# Patient Record
Sex: FEMALE | Race: WHITE | NOT HISPANIC OR LATINO | Employment: FULL TIME | URBAN - METROPOLITAN AREA
[De-identification: names, ages, dates, MRNs, and addresses within clinical notes are randomized per-mention and may not be internally consistent; named-entity substitution may affect disease eponyms.]

---

## 2017-08-02 ENCOUNTER — TRANSCRIBE ORDERS (OUTPATIENT)
Dept: LAB | Facility: HOSPITAL | Age: 32
End: 2017-08-02

## 2017-08-02 ENCOUNTER — APPOINTMENT (OUTPATIENT)
Dept: LAB | Facility: HOSPITAL | Age: 32
End: 2017-08-02
Payer: COMMERCIAL

## 2017-08-02 DIAGNOSIS — Z00.8 ENCOUNTER FOR OTHER GENERAL EXAMINATION: ICD-10-CM

## 2017-08-02 DIAGNOSIS — Z00.8 ENCOUNTER FOR OTHER GENERAL EXAMINATION: Primary | ICD-10-CM

## 2017-08-02 LAB
CHOLEST SERPL-MCNC: 152 MG/DL (ref 50–200)
EST. AVERAGE GLUCOSE BLD GHB EST-MCNC: 108 MG/DL
HBA1C MFR BLD: 5.4 % (ref 4.2–6.3)
HDLC SERPL-MCNC: 79 MG/DL (ref 40–60)
LDLC SERPL CALC-MCNC: 54 MG/DL (ref 0–100)
TRIGL SERPL-MCNC: 96 MG/DL

## 2017-08-02 PROCEDURE — 80061 LIPID PANEL: CPT

## 2017-08-02 PROCEDURE — 36415 COLL VENOUS BLD VENIPUNCTURE: CPT

## 2017-08-02 PROCEDURE — 83036 HEMOGLOBIN GLYCOSYLATED A1C: CPT

## 2018-07-27 ENCOUNTER — APPOINTMENT (OUTPATIENT)
Dept: LAB | Facility: HOSPITAL | Age: 33
End: 2018-07-27
Payer: COMMERCIAL

## 2018-07-27 ENCOUNTER — TRANSCRIBE ORDERS (OUTPATIENT)
Dept: LAB | Facility: HOSPITAL | Age: 33
End: 2018-07-27

## 2018-07-27 DIAGNOSIS — Z00.8 HEALTH EXAMINATION IN POPULATION SURVEY: ICD-10-CM

## 2018-07-27 DIAGNOSIS — Z00.8 HEALTH EXAMINATION IN POPULATION SURVEY: Primary | ICD-10-CM

## 2018-07-27 LAB
CHOLEST SERPL-MCNC: 144 MG/DL (ref 50–200)
EST. AVERAGE GLUCOSE BLD GHB EST-MCNC: 105 MG/DL
HBA1C MFR BLD: 5.3 % (ref 4.2–6.3)
HDLC SERPL-MCNC: 75 MG/DL (ref 40–60)
LDLC SERPL CALC-MCNC: 56 MG/DL (ref 0–100)
NONHDLC SERPL-MCNC: 69 MG/DL
TRIGL SERPL-MCNC: 67 MG/DL

## 2018-07-27 PROCEDURE — 83036 HEMOGLOBIN GLYCOSYLATED A1C: CPT

## 2018-07-27 PROCEDURE — 80061 LIPID PANEL: CPT

## 2018-07-27 PROCEDURE — 36415 COLL VENOUS BLD VENIPUNCTURE: CPT

## 2018-10-11 DIAGNOSIS — L03.011 PARONYCHIA OF FINGER OF RIGHT HAND: ICD-10-CM

## 2018-10-11 DIAGNOSIS — L03.011 PARONYCHIA OF FINGER OF RIGHT HAND: Primary | ICD-10-CM

## 2018-10-11 RX ORDER — CEPHALEXIN 500 MG/1
500 CAPSULE ORAL EVERY 12 HOURS SCHEDULED
Qty: 14 CAPSULE | Refills: 0 | Status: SHIPPED | OUTPATIENT
Start: 2018-10-11 | End: 2018-10-18

## 2018-10-11 RX ORDER — CEPHALEXIN 500 MG/1
500 CAPSULE ORAL EVERY 6 HOURS SCHEDULED
Qty: 14 CAPSULE | Refills: 0 | Status: SHIPPED | OUTPATIENT
Start: 2018-10-11 | End: 2018-10-11 | Stop reason: SDUPTHER

## 2020-12-19 ENCOUNTER — IMMUNIZATIONS (OUTPATIENT)
Dept: FAMILY MEDICINE CLINIC | Facility: HOSPITAL | Age: 35
End: 2020-12-19
Payer: COMMERCIAL

## 2020-12-19 DIAGNOSIS — Z23 ENCOUNTER FOR IMMUNIZATION: ICD-10-CM

## 2020-12-19 PROCEDURE — 0001A SARS-COV-2 / COVID-19 MRNA VACCINE (PFIZER-BIONTECH) 30 MCG: CPT

## 2020-12-19 PROCEDURE — 91300 SARS-COV-2 / COVID-19 MRNA VACCINE (PFIZER-BIONTECH) 30 MCG: CPT

## 2021-01-08 ENCOUNTER — IMMUNIZATIONS (OUTPATIENT)
Dept: FAMILY MEDICINE CLINIC | Facility: HOSPITAL | Age: 36
End: 2021-01-08

## 2021-01-08 DIAGNOSIS — Z23 ENCOUNTER FOR IMMUNIZATION: ICD-10-CM

## 2021-01-08 PROCEDURE — 0002A SARS-COV-2 / COVID-19 MRNA VACCINE (PFIZER-BIONTECH) 30 MCG: CPT

## 2021-01-08 PROCEDURE — 91300 SARS-COV-2 / COVID-19 MRNA VACCINE (PFIZER-BIONTECH) 30 MCG: CPT

## 2021-01-15 DIAGNOSIS — Z20.822 COVID-19 RULED OUT: Primary | ICD-10-CM

## 2021-02-12 ENCOUNTER — COSMETIC (OUTPATIENT)
Dept: DERMATOLOGY | Facility: CLINIC | Age: 36
End: 2021-02-12
Payer: COMMERCIAL

## 2021-02-12 DIAGNOSIS — Z41.1 ENCOUNTER FOR COSMETIC PROCEDURE: Primary | ICD-10-CM

## 2021-02-12 PROCEDURE — BOTOX1U PR BOTOX BY THE UNIT: Performed by: STUDENT IN AN ORGANIZED HEALTH CARE EDUCATION/TRAINING PROGRAM

## 2021-02-12 NOTE — PROGRESS NOTES
Botox Procedure Note    Screening Questions   Are you pregnant or breastfeeding? No  Do you take aspirin, fish oil or any other blood thinning medicines? No  Have you had an allergic reaction to any injectables before? No  Have you had any surgeries on your face? No  Have you been diagnosed with a muscle or nerve condition like myasthenia gravis, ALS or Lambert-Eaton syndrome? No      Diagnosis: rhytides    Location: glabella, forehead, periocular, nasalis    Informed consent: Discussed risks (infection, pain, bleeding, bruising, swelling, allergic reaction, paralysis of nearby muscles, eyelid droop, double vision, neck weakness, difficulty breathing, headache, undesirable cosmetic result, need for additional treatment, and death) and benefits of the procedure, as well as the alternatives  Informed consent was obtained  Photographs taken in chart after verbal consent     Preparation: The area was cleansed with alcohol  Procedure Details: Botox was injected into the dermis with a 30-gauge needle  Pressure applied to any bleeding  Lot Number: [id number]  Expiration: 08/23  Total Units Injected: 5U forehead, 18U periocular,    Plan: Patient instructed to remain upright for 6 hours  Tylenol may be used for headache  Allow 2 weeks before returning to clinic for additional dosing as needed  Call for any problems  Written instructions provided                                 Scribe Attestation    I,:  Bette García MA am acting as a scribe while in the presence of the attending physician :       I,:  Concetta Mantilla MD personally performed the services described in this documentation    as scribed in my presence :

## 2021-02-12 NOTE — PATIENT INSTRUCTIONS
Botulinum Toxin (Botox): PRE & POST-TREATMENT PATIENT INSTRUCTIONS    Pre-Treatment Instructions    Stop 3 DAYS BEFORE treatment to prevent bruising: AVOID blood thinning over-the-counter medications and herbal supplements such as Aspirin, Motrin, Ibuprofen, Aleve, Garlic, Vitamin E, Ginkgo Biloba, St  Esvins Wort, Fish Oil, Omega-3 capsules  Please note: If you are taking aspirin, warfarin, Plavix, or any other blood thinner under the guidance of a physician for heart disease, you should continue taking the medication as prescribed   Do not drink alcoholic beverages 24 hours before (or after) your treatment to avoid extra bruising   Do not use botulinum toxin if you are pregnant or breastfeeding, are allergic to any of its ingredients, or suffer from any neurological disorders  Please inform your provider if you have any questions about this prior to the treatment  Day of Treatment    Arrive to the office with a Mac Citizen  Please do not wear makeup  You may bring your own makeup to apply after your treatment   You may experience a mild amount of tenderness or a stinging sensation following injection  The injection sites will resemble bee stings but this should settle down within 20 minutes   You may experience some tenderness at the treatment site(s) that can last for a few hours or a few days  You may have bruises in the areas treated  Immediately After Treatment    It is best to try to exercise your treated muscles for 1-2 hours after treatment (e g  practice frowning, raising your eyebrows, and squinting)  This helps to work BOTOX® into your muscles   Stay in a vertical position for 6 hours following injection  DO NOT rest your head or lie down; sit upright   You may apply an ice or cold gel pack to the area(s) treated (avoiding pressure) as this helps reduce swelling and the potential for bruising      AVOID placing excessive pressure on the treated area(s) for the first few days; when cleansing your face or applying makeup, be very gentle   Do not work out for 24 hours   Any bruising can be covered up with make-up  You can also get topical Arnica (over the counter) to help th bruising resolve faster     Botox takes 7-10 days to kick in and lasts 3-4 months  If you want more Botox injected to the areas treated today, wait 2 weeks before coming back to the office

## 2021-02-22 ENCOUNTER — OFFICE VISIT (OUTPATIENT)
Dept: FAMILY MEDICINE CLINIC | Facility: CLINIC | Age: 36
End: 2021-02-22
Payer: COMMERCIAL

## 2021-02-22 VITALS
HEIGHT: 66 IN | SYSTOLIC BLOOD PRESSURE: 120 MMHG | DIASTOLIC BLOOD PRESSURE: 80 MMHG | TEMPERATURE: 97.4 F | BODY MASS INDEX: 20.51 KG/M2 | WEIGHT: 127.6 LBS

## 2021-02-22 DIAGNOSIS — M25.552 LEFT HIP PAIN: Primary | ICD-10-CM

## 2021-02-22 PROCEDURE — 99213 OFFICE O/P EST LOW 20 MIN: CPT | Performed by: FAMILY MEDICINE

## 2021-02-22 RX ORDER — MELOXICAM 15 MG/1
15 TABLET ORAL DAILY
Qty: 30 TABLET | Refills: 1 | Status: SHIPPED | OUTPATIENT
Start: 2021-02-22

## 2021-02-22 NOTE — PROGRESS NOTES
Assessment/Plan:  Patient go for x-ray left hip  Patient will start meloxicam daily  Patient will start physical therapy  Patient follow-up in 6 weeks if not seeing improvement  To consider MRI if not seeing improvement       Diagnoses and all orders for this visit:    Left hip pain  -     XR hip/pelv 2-3 vws left if performed; Future  -     Ambulatory referral to Physical Therapy; Future  -     meloxicam (MOBIC) 15 mg tablet; Take 1 tablet (15 mg total) by mouth daily            Subjective:        Patient ID: Sergio Hurd is a 28 y o  female  Patient is here with left hip pain anteriorly over the past 6-8 weeks  No direct trauma prior to this  Patient did have flare hip pain status post   Skiing  Patient did have a massage which made it worse  The patient without any instability  Patient does notice pain with crossing legs period patient did have some weakness which has improved  patient did use NSAIDs for roughly 10 days ibuprofen 600 mg twice daily  Patient is using intermittently  Patient with some low back pain intermittently  No change urination or defecation  No gynecologic issues presently  No redness or bruising noted  No rash noted  No fevers noted  The no significant change with change in footwear  The following portions of the patient's history were reviewed and updated as appropriate: allergies, current medications, past family history, past medical history, past social history, past surgical history and problem list       Review of Systems   Constitutional: Negative  HENT: Negative  Eyes: Negative  Respiratory: Negative  Cardiovascular: Negative  Gastrointestinal: Negative  Endocrine: Negative  Genitourinary: Negative  Musculoskeletal: Positive for arthralgias  Skin: Negative  Allergic/Immunologic: Negative  Neurological: Negative  Hematological: Negative  Psychiatric/Behavioral: Negative              Objective:               /80 (BP Location: Right arm, Patient Position: Sitting, Cuff Size: Standard)   Temp (!) 97 4 °F (36 3 °C) (Tympanic)   Ht 5' 5 5" (1 664 m)   Wt 57 9 kg (127 lb 9 6 oz)   BMI 20 91 kg/m²          Physical Exam  Vitals signs and nursing note reviewed  Constitutional:       General: She is not in acute distress  Appearance: Normal appearance  She is not ill-appearing, toxic-appearing or diaphoretic  HENT:      Head: Normocephalic and atraumatic  Right Ear: Tympanic membrane, ear canal and external ear normal  There is no impacted cerumen  Left Ear: Tympanic membrane, ear canal and external ear normal  There is no impacted cerumen  Nose: Nose normal  No congestion or rhinorrhea  Mouth/Throat:      Mouth: Mucous membranes are moist       Pharynx: No oropharyngeal exudate or posterior oropharyngeal erythema  Eyes:      General: No scleral icterus  Right eye: No discharge  Left eye: No discharge  Extraocular Movements: Extraocular movements intact  Conjunctiva/sclera: Conjunctivae normal       Pupils: Pupils are equal, round, and reactive to light  Neck:      Musculoskeletal: Normal range of motion and neck supple  No neck rigidity or muscular tenderness  Vascular: No carotid bruit  Cardiovascular:      Rate and Rhythm: Normal rate and regular rhythm  Pulses: Normal pulses  Heart sounds: Normal heart sounds  No murmur  No friction rub  No gallop  Pulmonary:      Effort: Pulmonary effort is normal  No respiratory distress  Breath sounds: Normal breath sounds  No stridor  No wheezing, rhonchi or rales  Chest:      Chest wall: No tenderness  Abdominal:      General: Abdomen is flat  Bowel sounds are normal  There is no distension  Palpations: Abdomen is soft  Tenderness: There is no abdominal tenderness  There is no guarding or rebound  Musculoskeletal:         General: Tenderness present  No swelling, deformity or signs of injury  Right lower leg: No edema  Left lower leg: No edema  Comments: Some tenderness in the left inguinal region with hip flexion as well as external rotation  No back pain with palpation  Lymphadenopathy:      Cervical: No cervical adenopathy  Skin:     General: Skin is warm and dry  Capillary Refill: Capillary refill takes less than 2 seconds  Coloration: Skin is not jaundiced  Findings: No bruising, erythema, lesion or rash  Neurological:      General: No focal deficit present  Mental Status: She is alert and oriented to person, place, and time  Cranial Nerves: No cranial nerve deficit  Sensory: No sensory deficit  Motor: No weakness  Coordination: Coordination normal       Gait: Gait normal    Psychiatric:         Mood and Affect: Mood normal          Behavior: Behavior normal          Thought Content:  Thought content normal          Judgment: Judgment normal

## 2021-02-23 ENCOUNTER — TELEPHONE (OUTPATIENT)
Dept: FAMILY MEDICINE CLINIC | Facility: CLINIC | Age: 36
End: 2021-02-23

## 2021-02-23 ENCOUNTER — TRANSCRIBE ORDERS (OUTPATIENT)
Dept: RADIOLOGY | Facility: HOSPITAL | Age: 36
End: 2021-02-23

## 2021-02-23 ENCOUNTER — HOSPITAL ENCOUNTER (OUTPATIENT)
Dept: RADIOLOGY | Facility: HOSPITAL | Age: 36
Discharge: HOME | End: 2021-02-23
Payer: COMMERCIAL

## 2021-02-23 DIAGNOSIS — M25.552 LEFT HIP PAIN: ICD-10-CM

## 2021-02-23 PROCEDURE — 73502 X-RAY EXAM HIP UNI 2-3 VIEWS: CPT

## 2021-03-03 ENCOUNTER — EVALUATION (OUTPATIENT)
Dept: PHYSICAL THERAPY | Facility: MEDICAL CENTER | Age: 36
End: 2021-03-03
Payer: COMMERCIAL

## 2021-03-03 DIAGNOSIS — M25.552 LEFT HIP PAIN: Primary | ICD-10-CM

## 2021-03-03 PROCEDURE — 97140 MANUAL THERAPY 1/> REGIONS: CPT | Performed by: PHYSICAL THERAPIST

## 2021-03-03 PROCEDURE — 97161 PT EVAL LOW COMPLEX 20 MIN: CPT | Performed by: PHYSICAL THERAPIST

## 2021-03-03 NOTE — PROGRESS NOTES
PT Evaluation     Today's date: 3/3/2021  Patient name: Sid King  : 1985  MRN: [id number]  Referring provider: Agustín Leal DO  Dx:   Encounter Diagnosis   Name Primary?  Left hip pain Yes                  Assessment  Assessment details: Sid King is a 29 y/o female who presents with complaints of left hip pain  The patients greatest concerns are not being able to continue her workout routine without hip pain  Primary movement impairment diagnosis of left iliopsoas tissue extensibility deficits, resulting in pathoanatomical symptoms of left hip pain, which limits her ability to perform functional activities without pain  Pt  will benefit from skilled PT services that includes manual therapy techniques to enhance tissue extensibility, neuromuscular re-education to facilitate motor control, therapeutic exercise to increase functional mobility, and modalities prn to reduce pain and inflammation    Impairments: abnormal muscle firing, abnormal or restricted ROM, activity intolerance, impaired physical strength, lacks appropriate home exercise program and pain with function  Understanding of Dx/Px/POC: good   Prognosis: good    Goals  Impairment Goals  - Pt I with initial HEP in 1-2 visits  - Improve lumbar ROM to [facility] in 4-6 weeks  - Increase strength to 5/5 in all affected areas in 4-6 weeks    Functional Goals  - Increase Functional Status Measure to: 85+ in 6-8 weeks  - Patient will be independent with comprehensive HEP in 6-8 weeks  - Ambulation is improved to prior level of function in 6-8 weeks  - Squatting is improved to prior level of function in 6-8 weeks  - Patient will be able to perform workout routine without pain afterwards in 6-8 weeks    Plan  Patient would benefit from: PT eval  Planned modality interventions: thermotherapy: hydrocollator packs  Planned therapy interventions: joint mobilization, manual therapy, massage, patient education, stretching, strengthening, therapeutic exercise, home exercise program, graded exercise, flexibility, neuromuscular re-education, body mechanics training and abdominal trunk stabilization  Frequency: 2x week  Duration in weeks: 8  Treatment plan discussed with: patient      Subjective Evaluation    History of Present Illness  Mechanism of injury: Patient reports that a couple months ago, she started to experience left achey hip pain after working out  She was lifting free weights and doing the elliptical often  Patient then took some time off with moderate relief and when she went skiing in January, the pain returned  Patient tried massage, which increased her pain  She notes relief c/ taking NSAIDs  She is currently taking meloxicam   Patient denies low back pain, but notes stiffness  Patient would like to return to performing her workout routine without difficulty  Pain  Current pain ratin  At best pain ratin  At worst pain ratin  Quality: dull ache and sharp  Alleviating factors: NSAIDs  Diagnostic Tests  X-ray: normal  Treatments  Previous treatment: massage  Current treatment: physical therapy  Patient Goals  Patient goals for therapy: decreased pain, increased strength, independence with ADLs/IADLs and return to sport/leisure activities        Objective     Palpation   Left   Hypertonic in the iliopsoas and TFL  Tenderness of the iliopsoas and TFL  Tenderness     Left Hip   No tenderness in the ASIS, PSIS and greater trochanter       Lumbar Screen  Lumbar range of motion within normal limits with the following exceptions:Limited lumbar extension    Neurological Testing     Sensation     Hip   Left Hip   Intact: light touch    Right Hip   Intact: light touch    Active Range of Motion     Lumbar   Flexion:  WFL  Extension:  Restriction level: minimal  Left lateral flexion:  WFL  Right lateral flexion:  WFL  Left rotation:  WFL  Right rotation:  [facility]    Passive Range of Motion   Left Hip   Normal passive range of motion    Right Hip   Normal passive range of motion    Joint Play   Left Hip   Hypomobile in the posterior hip capsule  Strength/Myotome Testing     Left Hip   Planes of Motion   Flexion: 3+  Extension: 4-  Abduction: 3+  Adduction: 4-    Isolated Muscles   Gluteus devi: 4-  Gluteus medius: 3+  Iliopsoas: 4-    Right Hip   Planes of Motion   Flexion: 5  Extension: 4  Abduction: 4  Adduction: 4    Isolated Muscles   Gluteus maximums: 4  Gluteus medius: 4  Iliopsoas: 5    Tests     Left Hip   Positive long sit and Kelly  Negative RUSS, FADIR, scour, SI compression and SI distraction  Modified Anthony: Positive  Additional Tests Details  (+) functional leg length discrepancy c/ L LE shorter    Functional Assessment      Squat    Left within functional limits and right within functional limits  Single Leg Squat   Left Leg  Valgus and anterior tibial translation beyond toes  Right Leg  Within functional limits  Single Leg Stance   Left single leg stance time: 10s  Right single leg stance time: 10s         Precautions: None    Manuals 3/3            Iliopsoas release AZ            STM/MFR AZ            UPAs L4-5 R AZ            Post hip mob AZ            Neuro Re-Ed             TA isometrics                                                    Ther Ex             Bridges 20x5s            SL bridges 2x10            Clams 3x10 5s hold            1/2 kneeling hip flex str 10x 10s                                      Foam Roll                           Ther Activity                                       Gait Training                                       Modalities             MH                               Flowsheet Rows      Most Recent Value   PT/OT G-Codes   Current Score  72   Projected Score  80          Benigno Olivas  3/3/2021,6:28 PM

## 2021-03-08 ENCOUNTER — OFFICE VISIT (OUTPATIENT)
Dept: PHYSICAL THERAPY | Facility: MEDICAL CENTER | Age: 36
End: 2021-03-08
Payer: COMMERCIAL

## 2021-03-08 DIAGNOSIS — M25.552 LEFT HIP PAIN: Primary | ICD-10-CM

## 2021-03-08 PROCEDURE — 97140 MANUAL THERAPY 1/> REGIONS: CPT | Performed by: PHYSICAL THERAPIST

## 2021-03-08 PROCEDURE — 97110 THERAPEUTIC EXERCISES: CPT | Performed by: PHYSICAL THERAPIST

## 2021-03-08 NOTE — PROGRESS NOTES
Daily Note     Today's date: 3/8/2021  Patient name: Estuardo Mg  : 1985  MRN: [id number]  Referring provider: Gerardo Escobedo DO  Dx:   Encounter Diagnosis     ICD-10-CM    1  Left hip pain  M25 552                   Subjective:  Patient states that she feels good  Objective: See treatment diary below  Assessment:  Patient presents without pain in the left hip  She presents c/ mild lumbar hypomobility bilaterally today  She did well c/ exercise progressions  Tolerated treatment well  Patient would benefit from continued PT  Plan: Continue per plan of care        Precautions: None    Manuals 3/3 3/8           Iliopsoas release AZ AZ           STM/MFR AZ AZ           UPAs L4-5 R AZ AZ           Post hip mob AZ AZ           Neuro Re-Ed             TA isometrics  20x5s           Marches  20x                                     Ther Ex             Bridges 20x5s 20x5s           SL bridges 2x10 3x10           Clams 3x10 5s hold 3x10 5s hold GR           Prone glue squeezes  30x5s           Prone glute sets KF  20x5s           1/2 kneeling hip flex str 10x 10s 10x 10s   2 way                                     Foam Roll   2'                        Ther Activity                                       Gait Training                                       Modalities             MH

## 2021-03-12 ENCOUNTER — OFFICE VISIT (OUTPATIENT)
Dept: PHYSICAL THERAPY | Facility: MEDICAL CENTER | Age: 36
End: 2021-03-12
Payer: COMMERCIAL

## 2021-03-12 DIAGNOSIS — M25.552 LEFT HIP PAIN: Primary | ICD-10-CM

## 2021-03-12 PROCEDURE — 97140 MANUAL THERAPY 1/> REGIONS: CPT | Performed by: PHYSICAL THERAPIST

## 2021-03-12 PROCEDURE — 97110 THERAPEUTIC EXERCISES: CPT | Performed by: PHYSICAL THERAPIST

## 2021-03-12 NOTE — PROGRESS NOTES
Daily Note     Today's date: 3/12/2021  Patient name: Quintin Garcia  : 1985  MRN: [id number]  Referring provider: Yari Batista DO  Dx:   Encounter Diagnosis     ICD-10-CM    1  Left hip pain  M25 552                   Subjective:  Patient states that her left hip feels better  Objective: See treatment diary below    Assessment:  Patient presents c/ left SI joint pain today and pelvic malalignment  She has pain c/ bending forward and extending  MET reduced her pain  Did not perform single leg bridges today  Tolerated treatment well  Patient would continue to benefit from PT  Plan: Continue per plan of care        Precautions: None    Manuals 3/3 3/8 3/12          Iliopsoas release AZ AZ AZ L          STM/MFR AZ AZ           UPAs L4-5 R AZ AZ AZ b/l          Post hip mob AZ AZ AZ          Neuro Re-Ed             TA isometrics  20x5s 20x5s          Marches  20x 20x                                    Ther Ex             MET   R heel 10x5s          Hip add   20x5s          Bridges 20x5s 20x5s 20x c/ ball          SL bridges 2x10 3x10 3x10          Clams 3x10 5s hold 3x10 5s hold GR 3x10 5s hold GR          Prone glue squeezes  30x5s 30x5s          Prone glute sets KF  20x5s [id number] lift          1/2 kneeling hip flex str 10x 10s 10x 10s   2 way 10x 10s 2 way                                    Foam Roll   2' 2'                       Ther Activity                                       Gait Training                                       Modalities

## 2021-03-17 ENCOUNTER — OFFICE VISIT (OUTPATIENT)
Dept: PHYSICAL THERAPY | Facility: MEDICAL CENTER | Age: 36
End: 2021-03-17
Payer: COMMERCIAL

## 2021-03-17 DIAGNOSIS — M25.552 LEFT HIP PAIN: Primary | ICD-10-CM

## 2021-03-17 PROCEDURE — 97112 NEUROMUSCULAR REEDUCATION: CPT | Performed by: PHYSICAL THERAPIST

## 2021-03-17 PROCEDURE — 97110 THERAPEUTIC EXERCISES: CPT | Performed by: PHYSICAL THERAPIST

## 2021-03-17 PROCEDURE — 97140 MANUAL THERAPY 1/> REGIONS: CPT | Performed by: PHYSICAL THERAPIST

## 2021-03-17 NOTE — PROGRESS NOTES
Daily Note     Today's date: 3/17/2021  Patient name: Mark Bourgeois  : 1985  MRN: [id number]  Referring provider: Nyla Bullock DO  Dx:   Encounter Diagnosis     ICD-10-CM    1  Left hip pain  M25 552                   Subjective:  Patient states that she feels good  Objective: See treatment diary below  Assessment:  Patient presents c/ left piriformis and iliopsoas tightness  She demonstrates increased posterior chain neuromuscular control  She was able to workout this week without pain  Tolerated treatment well  Patient would benefit from continued PT  Plan: Continue per plan of care        Precautions: None    Manuals 3/3 3/8 3/12 3/17         Iliopsoas release AZ AZ AZ L AZ         STM/MFR AZ AZ           UPAs L4-5 R AZ AZ AZ b/l AZ         Post hip mob AZ AZ AZ          Neuro Re-Ed             TA isometrics  20x5s 20x5s 20x5s         Marches  20x 20x 20x         Marches c/ band    20x blue                      Ther Ex             MET   R heel 10x5s R heel 10x5s         Hip add   20x5s 20x5s         Bridges 20x5s 20x5s 20x c/ ball 20x c/ ball         SL bridges 2x10 3x10 3x10 np         Clams 3x10 5s hold 3x10 5s hold GR 3x10 5s hold GR 3x10 5s hold GR         Prone glue squeezes  30x5s 30x5s 30x5s         Prone glute sets KF  20x5s [id number] lift 20x5s         1/2 kneeling hip flex str 10x 10s 10x 10s   2 way 10x 10s 2 way 10x 10s 2 way                                   Foam Roll   2' 2' 2'                      Ther Activity                                       Gait Training                                       Modalities

## 2021-03-24 ENCOUNTER — OFFICE VISIT (OUTPATIENT)
Dept: PHYSICAL THERAPY | Facility: MEDICAL CENTER | Age: 36
End: 2021-03-24
Payer: COMMERCIAL

## 2021-03-24 DIAGNOSIS — M25.552 LEFT HIP PAIN: Primary | ICD-10-CM

## 2021-03-24 PROCEDURE — G0145 SCR C/V CYTO,THINLAYER,RESCR: HCPCS | Performed by: OBSTETRICS & GYNECOLOGY

## 2021-03-24 PROCEDURE — 97140 MANUAL THERAPY 1/> REGIONS: CPT | Performed by: PHYSICAL THERAPIST

## 2021-03-24 PROCEDURE — 97110 THERAPEUTIC EXERCISES: CPT | Performed by: PHYSICAL THERAPIST

## 2021-03-24 PROCEDURE — 87624 HPV HI-RISK TYP POOLED RSLT: CPT | Performed by: OBSTETRICS & GYNECOLOGY

## 2021-03-24 PROCEDURE — 97112 NEUROMUSCULAR REEDUCATION: CPT | Performed by: PHYSICAL THERAPIST

## 2021-03-24 NOTE — PROGRESS NOTES
Daily Note     Today's date: 3/24/2021  Patient name: Alexys Castillo  : 1985  MRN: [id number]  Referring provider: Parveen Farfan DO  Dx:   Encounter Diagnosis     ICD-10-CM    1  Left hip pain  M25 552                   Subjective:  Patient states that she is doing well  Objective: See treatment diary below  Assessment:  Patient presents without pelvic malalignment  She is doing well c/ exercise progressions  Tolerated treatment well  Patient would benefit from continued PT  Plan: Continue per POC and d/c to HEP as able       Precautions: None    Manuals 3/3 3/8 3/12 3/17 3/24        Iliopsoas release AZ AZ AZ L AZ AZ        STM/MFR AZ AZ           UPAs L4-5 R AZ AZ AZ b/l AZ AZ        Post hip mob AZ AZ AZ          Neuro Re-Ed             TA isometrics  20x5s 20x5s 20x5s 20x5s        Marches  20x 20x 20x 20x        Marches c/ band    20x blue 20x blue                     Ther Ex             MET   R heel 10x5s R heel 10x5s np        Hip add   20x5s 20x5s 20x5s        Bridges 20x5s 20x5s 20x c/ ball 20x c/ ball 20x c/ ball        SL bridges 2x10 3x10 3x10 np np        Clams 3x10 5s hold 3x10 5s hold GR 3x10 5s hold GR 3x10 5s hold GR [id number]        Prone glue squeezes  30x5s 30x5s 30x5s 30x5s        Prone glute sets KF  20x5s [id number] lift 20x5s 20x5s        1/2 kneeling hip flex str 10x 10s 10x 10s   2 way 10x 10s 2 way 10x 10s 2 way 10x 10s 2 way                                  Foam Roll   2' 2' 2' 2'                     Ther Activity                                       Gait Training                                       Modalities             MH

## 2021-03-25 ENCOUNTER — LAB REQUISITION (OUTPATIENT)
Dept: LAB | Facility: HOSPITAL | Age: 36
End: 2021-03-25
Payer: COMMERCIAL

## 2021-03-25 DIAGNOSIS — Z12.4 ENCOUNTER FOR SCREENING FOR MALIGNANT NEOPLASM OF CERVIX: ICD-10-CM

## 2021-03-26 LAB
HPV HR 12 DNA CVX QL NAA+PROBE: NEGATIVE
HPV16 DNA CVX QL NAA+PROBE: NEGATIVE
HPV18 DNA CVX QL NAA+PROBE: NEGATIVE

## 2021-03-30 LAB
LAB AP GYN PRIMARY INTERPRETATION: NORMAL
Lab: NORMAL

## 2021-04-14 ENCOUNTER — TRANSCRIBE ORDERS (OUTPATIENT)
Dept: LAB | Facility: HOSPITAL | Age: 36
End: 2021-04-14

## 2021-04-14 ENCOUNTER — APPOINTMENT (OUTPATIENT)
Dept: LAB | Facility: HOSPITAL | Age: 36
End: 2021-04-14
Payer: COMMERCIAL

## 2021-04-14 DIAGNOSIS — Z00.8 HEALTH EXAMINATION IN POPULATION SURVEY: Primary | ICD-10-CM

## 2021-04-14 DIAGNOSIS — Z00.8 HEALTH EXAMINATION IN POPULATION SURVEY: ICD-10-CM

## 2021-04-14 LAB
CHOLEST SERPL-MCNC: 158 MG/DL (ref 50–200)
EST. AVERAGE GLUCOSE BLD GHB EST-MCNC: 105 MG/DL
HBA1C MFR BLD: 5.3 %
HDLC SERPL-MCNC: 75 MG/DL
LDLC SERPL CALC-MCNC: 68 MG/DL (ref 0–100)
NONHDLC SERPL-MCNC: 83 MG/DL
TRIGL SERPL-MCNC: 73 MG/DL

## 2021-04-14 PROCEDURE — 36415 COLL VENOUS BLD VENIPUNCTURE: CPT

## 2021-04-14 PROCEDURE — 83036 HEMOGLOBIN GLYCOSYLATED A1C: CPT

## 2021-04-14 PROCEDURE — 80061 LIPID PANEL: CPT

## 2021-10-16 ENCOUNTER — IMMUNIZATIONS (OUTPATIENT)
Dept: FAMILY MEDICINE CLINIC | Facility: HOSPITAL | Age: 36
End: 2021-10-16

## 2021-10-16 DIAGNOSIS — Z23 ENCOUNTER FOR IMMUNIZATION: Primary | ICD-10-CM

## 2021-10-16 PROCEDURE — 91300 SARS-COV-2 / COVID-19 MRNA VACCINE (PFIZER-BIONTECH) 30 MCG: CPT

## 2021-10-16 PROCEDURE — 0001A SARS-COV-2 / COVID-19 MRNA VACCINE (PFIZER-BIONTECH) 30 MCG: CPT

## 2021-12-23 ENCOUNTER — LAB (OUTPATIENT)
Dept: LAB | Facility: HOSPITAL | Age: 36
End: 2021-12-23
Payer: COMMERCIAL

## 2021-12-23 ENCOUNTER — OFFICE VISIT (OUTPATIENT)
Dept: FAMILY MEDICINE CLINIC | Facility: CLINIC | Age: 36
End: 2021-12-23
Payer: COMMERCIAL

## 2021-12-23 VITALS
OXYGEN SATURATION: 98 % | SYSTOLIC BLOOD PRESSURE: 102 MMHG | HEART RATE: 70 BPM | DIASTOLIC BLOOD PRESSURE: 76 MMHG | WEIGHT: 127 LBS | RESPIRATION RATE: 14 BRPM | BODY MASS INDEX: 20.41 KG/M2 | TEMPERATURE: 97.2 F | HEIGHT: 66 IN

## 2021-12-23 DIAGNOSIS — L30.9 DERMATITIS: ICD-10-CM

## 2021-12-23 DIAGNOSIS — L30.9 DERMATITIS: Primary | ICD-10-CM

## 2021-12-23 LAB
ALBUMIN SERPL BCP-MCNC: 4.2 G/DL (ref 3.5–5)
ALP SERPL-CCNC: 73 U/L (ref 46–116)
ALT SERPL W P-5'-P-CCNC: 24 U/L (ref 12–78)
ANION GAP SERPL CALCULATED.3IONS-SCNC: 6 MMOL/L (ref 4–13)
AST SERPL W P-5'-P-CCNC: 19 U/L (ref 5–45)
BASOPHILS # BLD AUTO: 0.02 THOUSANDS/ΜL (ref 0–0.1)
BASOPHILS NFR BLD AUTO: 0 % (ref 0–1)
BILIRUB SERPL-MCNC: 0.43 MG/DL (ref 0.2–1)
BUN SERPL-MCNC: 13 MG/DL (ref 5–25)
CALCIUM SERPL-MCNC: 9.1 MG/DL (ref 8.3–10.1)
CHLORIDE SERPL-SCNC: 105 MMOL/L (ref 100–108)
CO2 SERPL-SCNC: 26 MMOL/L (ref 21–32)
CREAT SERPL-MCNC: 0.69 MG/DL (ref 0.6–1.3)
EOSINOPHIL # BLD AUTO: 0.09 THOUSAND/ΜL (ref 0–0.61)
EOSINOPHIL NFR BLD AUTO: 2 % (ref 0–6)
ERYTHROCYTE [DISTWIDTH] IN BLOOD BY AUTOMATED COUNT: 13.3 % (ref 11.6–15.1)
GFR SERPL CREATININE-BSD FRML MDRD: 112 ML/MIN/1.73SQ M
GLUCOSE SERPL-MCNC: 79 MG/DL (ref 65–140)
HCT VFR BLD AUTO: 41.6 % (ref 34.8–46.1)
HGB BLD-MCNC: 13.6 G/DL (ref 11.5–15.4)
IMM GRANULOCYTES # BLD AUTO: 0.02 THOUSAND/UL (ref 0–0.2)
IMM GRANULOCYTES NFR BLD AUTO: 0 % (ref 0–2)
LYMPHOCYTES # BLD AUTO: 1.82 THOUSANDS/ΜL (ref 0.6–4.47)
LYMPHOCYTES NFR BLD AUTO: 34 % (ref 14–44)
MCH RBC QN AUTO: 31.5 PG (ref 26.8–34.3)
MCHC RBC AUTO-ENTMCNC: 32.7 G/DL (ref 31.4–37.4)
MCV RBC AUTO: 96 FL (ref 82–98)
MONOCYTES # BLD AUTO: 0.5 THOUSAND/ΜL (ref 0.17–1.22)
MONOCYTES NFR BLD AUTO: 9 % (ref 4–12)
NEUTROPHILS # BLD AUTO: 2.96 THOUSANDS/ΜL (ref 1.85–7.62)
NEUTS SEG NFR BLD AUTO: 55 % (ref 43–75)
NRBC BLD AUTO-RTO: 0 /100 WBCS
PLATELET # BLD AUTO: 286 THOUSANDS/UL (ref 149–390)
PMV BLD AUTO: 10.5 FL (ref 8.9–12.7)
POTASSIUM SERPL-SCNC: 4.1 MMOL/L (ref 3.5–5.3)
PROT SERPL-MCNC: 8.2 G/DL (ref 6.4–8.2)
RBC # BLD AUTO: 4.32 MILLION/UL (ref 3.81–5.12)
SODIUM SERPL-SCNC: 137 MMOL/L (ref 136–145)
TSH SERPL DL<=0.05 MIU/L-ACNC: 1.62 UIU/ML (ref 0.36–3.74)
WBC # BLD AUTO: 5.41 THOUSAND/UL (ref 4.31–10.16)

## 2021-12-23 PROCEDURE — 84443 ASSAY THYROID STIM HORMONE: CPT

## 2021-12-23 PROCEDURE — 85025 COMPLETE CBC W/AUTO DIFF WBC: CPT

## 2021-12-23 PROCEDURE — 99213 OFFICE O/P EST LOW 20 MIN: CPT | Performed by: FAMILY MEDICINE

## 2021-12-23 PROCEDURE — 36415 COLL VENOUS BLD VENIPUNCTURE: CPT

## 2021-12-23 PROCEDURE — 80053 COMPREHEN METABOLIC PANEL: CPT

## 2021-12-23 RX ORDER — CLOBETASOL PROPIONATE 0.5 MG/G
AEROSOL, FOAM TOPICAL 2 TIMES DAILY
Qty: 50 G | Refills: 1 | Status: SHIPPED | OUTPATIENT
Start: 2021-12-23

## 2022-05-05 ENCOUNTER — COSMETIC (OUTPATIENT)
Dept: PLASTIC SURGERY | Facility: CLINIC | Age: 37
End: 2022-05-05

## 2022-05-05 DIAGNOSIS — Z41.1 ENCOUNTER FOR COSMETIC PROCEDURE: ICD-10-CM

## 2022-05-05 PROCEDURE — RECHECK: Performed by: STUDENT IN AN ORGANIZED HEALTH CARE EDUCATION/TRAINING PROGRAM

## 2022-05-05 PROCEDURE — BOTOX1U PR BOTOX BY THE UNIT: Performed by: STUDENT IN AN ORGANIZED HEALTH CARE EDUCATION/TRAINING PROGRAM

## 2022-05-05 NOTE — PROGRESS NOTES
Botox Consult     First time?: no  Allergies: none  Blood thinners: no  Pregnant: no  Neuromuscular conditions: none    Patient has had botox, interested in maintenance  Will proceed with 40 units of botox     Risks and benefits discussed, patient agreed to proceed       10 units to each crows feet  16 units to glabellum  4 units to forehead     Total 40 units, 30% off, botox day     Patient tolerated well, f/u in 3 months        MD Teo Martini [facility]   [address] [facility] [address], [address]   Office: [phone]

## 2022-09-13 ENCOUNTER — TRANSCRIBE ORDERS (OUTPATIENT)
Dept: LAB | Facility: CLINIC | Age: 37
End: 2022-09-13

## 2022-09-13 ENCOUNTER — APPOINTMENT (OUTPATIENT)
Dept: LAB | Facility: CLINIC | Age: 37
End: 2022-09-13

## 2022-09-13 DIAGNOSIS — Z00.8 HEALTH EXAMINATION IN POPULATION SURVEY: Primary | ICD-10-CM

## 2022-09-13 DIAGNOSIS — Z00.8 HEALTH EXAMINATION IN POPULATION SURVEY: ICD-10-CM

## 2022-09-13 LAB
CHOLEST SERPL-MCNC: 183 MG/DL
EST. AVERAGE GLUCOSE BLD GHB EST-MCNC: 105 MG/DL
HBA1C MFR BLD: 5.3 %
HDLC SERPL-MCNC: 77 MG/DL
LDLC SERPL CALC-MCNC: 80 MG/DL (ref 0–100)
NONHDLC SERPL-MCNC: 106 MG/DL
TRIGL SERPL-MCNC: 128 MG/DL

## 2022-09-13 PROCEDURE — 83036 HEMOGLOBIN GLYCOSYLATED A1C: CPT

## 2022-09-13 PROCEDURE — 36415 COLL VENOUS BLD VENIPUNCTURE: CPT

## 2022-09-13 PROCEDURE — 80061 LIPID PANEL: CPT

## 2022-10-03 ENCOUNTER — APPOINTMENT (OUTPATIENT)
Dept: LAB | Facility: MEDICAL CENTER | Age: 37
End: 2022-10-03
Payer: COMMERCIAL

## 2022-10-03 ENCOUNTER — OFFICE VISIT (OUTPATIENT)
Dept: FAMILY MEDICINE CLINIC | Facility: CLINIC | Age: 37
End: 2022-10-03
Payer: COMMERCIAL

## 2022-10-03 VITALS
WEIGHT: 133.4 LBS | BODY MASS INDEX: 21.44 KG/M2 | DIASTOLIC BLOOD PRESSURE: 68 MMHG | TEMPERATURE: 98.3 F | SYSTOLIC BLOOD PRESSURE: 110 MMHG | HEIGHT: 66 IN

## 2022-10-03 DIAGNOSIS — R01.1 HEART MURMUR: ICD-10-CM

## 2022-10-03 DIAGNOSIS — R10.32 LEFT LOWER QUADRANT PAIN: ICD-10-CM

## 2022-10-03 DIAGNOSIS — R10.32 LEFT LOWER QUADRANT PAIN: Primary | ICD-10-CM

## 2022-10-03 LAB
ALBUMIN SERPL BCP-MCNC: 3.8 G/DL (ref 3.5–5)
ALP SERPL-CCNC: 77 U/L (ref 46–116)
ALT SERPL W P-5'-P-CCNC: 22 U/L (ref 12–78)
AMORPH URATE CRY URNS QL MICRO: ABNORMAL
ANION GAP SERPL CALCULATED.3IONS-SCNC: 5 MMOL/L (ref 4–13)
AST SERPL W P-5'-P-CCNC: 14 U/L (ref 5–45)
B-HCG SERPL-ACNC: <2 MIU/ML
BACTERIA UR QL AUTO: ABNORMAL /HPF
BASOPHILS # BLD AUTO: 0.04 THOUSANDS/ΜL (ref 0–0.1)
BASOPHILS NFR BLD AUTO: 1 % (ref 0–1)
BILIRUB SERPL-MCNC: 0.52 MG/DL (ref 0.2–1)
BILIRUB UR QL STRIP: NEGATIVE
BUN SERPL-MCNC: 11 MG/DL (ref 5–25)
CALCIUM SERPL-MCNC: 9.2 MG/DL (ref 8.3–10.1)
CHLORIDE SERPL-SCNC: 107 MMOL/L (ref 96–108)
CLARITY UR: ABNORMAL
CO2 SERPL-SCNC: 23 MMOL/L (ref 21–32)
COLOR UR: ABNORMAL
CREAT SERPL-MCNC: 0.81 MG/DL (ref 0.6–1.3)
EOSINOPHIL # BLD AUTO: 0.1 THOUSAND/ΜL (ref 0–0.61)
EOSINOPHIL NFR BLD AUTO: 1 % (ref 0–6)
ERYTHROCYTE [DISTWIDTH] IN BLOOD BY AUTOMATED COUNT: 12.3 % (ref 11.6–15.1)
GFR SERPL CREATININE-BSD FRML MDRD: 93 ML/MIN/1.73SQ M
GLUCOSE SERPL-MCNC: 87 MG/DL (ref 65–140)
GLUCOSE UR STRIP-MCNC: NEGATIVE MG/DL
HCT VFR BLD AUTO: 37 % (ref 34.8–46.1)
HGB BLD-MCNC: 12 G/DL (ref 11.5–15.4)
HGB UR QL STRIP.AUTO: ABNORMAL
IMM GRANULOCYTES # BLD AUTO: 0.04 THOUSAND/UL (ref 0–0.2)
IMM GRANULOCYTES NFR BLD AUTO: 1 % (ref 0–2)
KETONES UR STRIP-MCNC: NEGATIVE MG/DL
LEUKOCYTE ESTERASE UR QL STRIP: NEGATIVE
LIPASE SERPL-CCNC: 99 U/L (ref 73–393)
LYMPHOCYTES # BLD AUTO: 2.63 THOUSANDS/ΜL (ref 0.6–4.47)
LYMPHOCYTES NFR BLD AUTO: 34 % (ref 14–44)
MCH RBC QN AUTO: 31 PG (ref 26.8–34.3)
MCHC RBC AUTO-ENTMCNC: 32.4 G/DL (ref 31.4–37.4)
MCV RBC AUTO: 96 FL (ref 82–98)
MONOCYTES # BLD AUTO: 0.69 THOUSAND/ΜL (ref 0.17–1.22)
MONOCYTES NFR BLD AUTO: 9 % (ref 4–12)
MUCOUS THREADS UR QL AUTO: ABNORMAL
NEUTROPHILS # BLD AUTO: 4.34 THOUSANDS/ΜL (ref 1.85–7.62)
NEUTS SEG NFR BLD AUTO: 54 % (ref 43–75)
NITRITE UR QL STRIP: NEGATIVE
NON-SQ EPI CELLS URNS QL MICRO: ABNORMAL /HPF
NRBC BLD AUTO-RTO: 0 /100 WBCS
PH UR STRIP.AUTO: 5.5 [PH]
PLATELET # BLD AUTO: 274 THOUSANDS/UL (ref 149–390)
PMV BLD AUTO: 10.8 FL (ref 8.9–12.7)
POTASSIUM SERPL-SCNC: 4.4 MMOL/L (ref 3.5–5.3)
PROT SERPL-MCNC: 7.8 G/DL (ref 6.4–8.4)
PROT UR STRIP-MCNC: ABNORMAL MG/DL
RBC # BLD AUTO: 3.87 MILLION/UL (ref 3.81–5.12)
RBC #/AREA URNS AUTO: ABNORMAL /HPF
SODIUM SERPL-SCNC: 135 MMOL/L (ref 135–147)
SP GR UR STRIP.AUTO: 1.03 (ref 1–1.03)
UROBILINOGEN UR STRIP-ACNC: <2 MG/DL
WBC # BLD AUTO: 7.84 THOUSAND/UL (ref 4.31–10.16)
WBC #/AREA URNS AUTO: ABNORMAL /HPF

## 2022-10-03 PROCEDURE — 36415 COLL VENOUS BLD VENIPUNCTURE: CPT

## 2022-10-03 PROCEDURE — 81001 URINALYSIS AUTO W/SCOPE: CPT | Performed by: FAMILY MEDICINE

## 2022-10-03 PROCEDURE — 80053 COMPREHEN METABOLIC PANEL: CPT

## 2022-10-03 PROCEDURE — 99214 OFFICE O/P EST MOD 30 MIN: CPT | Performed by: FAMILY MEDICINE

## 2022-10-03 PROCEDURE — 84703 CHORIONIC GONADOTROPIN ASSAY: CPT

## 2022-10-03 PROCEDURE — 85025 COMPLETE CBC W/AUTO DIFF WBC: CPT

## 2022-10-03 PROCEDURE — 83690 ASSAY OF LIPASE: CPT

## 2022-10-03 PROCEDURE — 87086 URINE CULTURE/COLONY COUNT: CPT | Performed by: FAMILY MEDICINE

## 2022-10-03 PROCEDURE — 84702 CHORIONIC GONADOTROPIN TEST: CPT

## 2022-10-03 NOTE — PROGRESS NOTES
Assessment/Plan:  Patient have laboratory studies done  Patient have urinalysis also  Patient have echo for new heart murmur  Treatment will be based on findings  Diagnoses and all orders for this visit:    Left lower quadrant pain  -     CT abdomen pelvis w contrast; Future  -     Comprehensive metabolic panel; Future  -     CBC and differential; Future  -     Lipase; Future  -     hCG Ql w/reflex to hCG Qn; Future    Heart murmur  -     Echo complete w/ contrast if indicated; Future            Subjective:        Patient ID: Gabriela Schafer is a 39 y o  female  Patient is here with some abdominal cramping and pain in the lower quadrants of the abdomen which began on Saturday  No diarrhea or fevers or chills  Patient did have significant pain causing her to double up in the disposition  Patient's pain slowly improving  Patient did take laxative and had bowel movement with no significant improvement in symptoms  No urinary issues  Patient with good appetite  The patient does notice pain 2 hours after eating  No other medications being used at this time  Patient did have some pain radiating to left scapula  No chest pain or shortness of breath associated with this    Abdominal Pain          The following portions of the patient's history were reviewed and updated as appropriate: allergies, current medications, past family history, past medical history, past social history, past surgical history and problem list       Review of Systems   Constitutional: Negative  HENT: Negative  Eyes: Negative  Respiratory: Negative  Cardiovascular: Negative  Gastrointestinal: Positive for abdominal pain  Endocrine: Negative  Genitourinary: Negative  Musculoskeletal: Negative  Skin: Negative  Allergic/Immunologic: Negative  Neurological: Negative  Hematological: Negative  Psychiatric/Behavioral: Negative              Objective:        Depression Screening and Follow-up Plan: Patient was screened for depression during today's encounter  They screened negative with a PHQ-2 score of 0             /68 (BP Location: Right arm, Patient Position: Sitting, Cuff Size: Standard)   Temp 98 3 °F (36 8 °C) (Temporal)   Ht 5' 5 5" (1 664 m)   Wt 60 5 kg (133 lb 6 4 oz)   BMI 21 86 kg/m²          Physical Exam  Vitals and nursing note reviewed  Constitutional:       General: She is not in acute distress  Appearance: Normal appearance  She is not ill-appearing, toxic-appearing or diaphoretic  HENT:      Head: Normocephalic and atraumatic  Right Ear: Tympanic membrane, ear canal and external ear normal  There is no impacted cerumen  Left Ear: Tympanic membrane, ear canal and external ear normal  There is no impacted cerumen  Nose: Nose normal  No congestion or rhinorrhea  Mouth/Throat:      Mouth: Mucous membranes are moist       Pharynx: No oropharyngeal exudate or posterior oropharyngeal erythema  Eyes:      General: No scleral icterus  Right eye: No discharge  Left eye: No discharge  Extraocular Movements: Extraocular movements intact  Conjunctiva/sclera: Conjunctivae normal       Pupils: Pupils are equal, round, and reactive to light  Neck:      Vascular: No carotid bruit  Cardiovascular:      Rate and Rhythm: Normal rate and regular rhythm  Pulses: Normal pulses  Heart sounds: Murmur heard  No friction rub  No gallop  Pulmonary:      Effort: Pulmonary effort is normal  No respiratory distress  Breath sounds: Normal breath sounds  No stridor  No wheezing, rhonchi or rales  Chest:      Chest wall: No tenderness  Abdominal:      General: Abdomen is flat  Bowel sounds are normal  There is no distension  Palpations: Abdomen is soft  Tenderness: There is abdominal tenderness  There is no guarding or rebound  Comments: Pain with palpation left lower quadrant     Musculoskeletal:         General: No swelling, tenderness, deformity or signs of injury  Normal range of motion  Cervical back: Normal range of motion and neck supple  No rigidity  No muscular tenderness  Right lower leg: No edema  Left lower leg: No edema  Lymphadenopathy:      Cervical: No cervical adenopathy  Skin:     General: Skin is warm and dry  Capillary Refill: Capillary refill takes less than 2 seconds  Coloration: Skin is not jaundiced  Findings: No bruising, erythema, lesion or rash  Neurological:      General: No focal deficit present  Mental Status: She is alert and oriented to person, place, and time  Cranial Nerves: No cranial nerve deficit  Sensory: No sensory deficit  Motor: No weakness  Coordination: Coordination normal       Gait: Gait normal    Psychiatric:         Mood and Affect: Mood normal          Behavior: Behavior normal          Thought Content:  Thought content normal          Judgment: Judgment normal

## 2022-10-04 ENCOUNTER — HOSPITAL ENCOUNTER (OUTPATIENT)
Dept: RADIOLOGY | Facility: HOSPITAL | Age: 37
Discharge: HOME | End: 2022-10-04
Payer: COMMERCIAL

## 2022-10-04 DIAGNOSIS — R10.32 LEFT LOWER QUADRANT PAIN: ICD-10-CM

## 2022-10-04 LAB
BACTERIA UR CULT: NORMAL
HCG SERPL QL: NEGATIVE

## 2022-10-04 PROCEDURE — G1004 CDSM NDSC: HCPCS

## 2022-10-04 PROCEDURE — 74177 CT ABD & PELVIS W/CONTRAST: CPT

## 2022-10-04 RX ADMIN — IOHEXOL 100 ML: 350 INJECTION, SOLUTION INTRAVENOUS at 11:36

## 2022-10-13 ENCOUNTER — COSMETIC (OUTPATIENT)
Dept: PLASTIC SURGERY | Facility: CLINIC | Age: 37
End: 2022-10-13

## 2022-10-13 DIAGNOSIS — Z41.1 ENCOUNTER FOR COSMETIC PROCEDURE: Primary | ICD-10-CM

## 2022-10-13 PROCEDURE — RECHECK: Performed by: STUDENT IN AN ORGANIZED HEALTH CARE EDUCATION/TRAINING PROGRAM

## 2022-10-14 NOTE — PROGRESS NOTES
Botox Consult     First time?: no, had with me in May  Allergies: none  Blood thinners: no  Pregnant: no  Neuromuscular conditions: none     Patient has had botox, interested in maintenance       Will proceed with 40 units of botox     Risks and benefits discussed, patient agreed to proceed      10 units to each crows feet  16 units to glabellum  4 units to forehead     Total 40 units, 10% off, botox day     Patient tolerated well, f/u in 3 months        Tahmina Curry MD   [facility] Plastic and Reconstructive Surgery   [address] [place] [address] [address], [address] [facility]   Office: [phone]

## 2022-10-20 ENCOUNTER — HOSPITAL ENCOUNTER (OUTPATIENT)
Dept: NON INVASIVE DIAGNOSTICS | Facility: CLINIC | Age: 37
Discharge: HOME | End: 2022-10-20
Payer: COMMERCIAL

## 2022-10-20 VITALS
HEART RATE: 80 BPM | DIASTOLIC BLOOD PRESSURE: 68 MMHG | SYSTOLIC BLOOD PRESSURE: 110 MMHG | BODY MASS INDEX: 22.16 KG/M2 | WEIGHT: 133 LBS | HEIGHT: 65 IN

## 2022-10-20 DIAGNOSIS — R01.1 HEART MURMUR: ICD-10-CM

## 2022-10-20 LAB
AORTIC ROOT: 2.5 CM
APICAL FOUR CHAMBER EJECTION FRACTION: 60 %
E WAVE DECELERATION TIME: 202 MS
FRACTIONAL SHORTENING: 35 (ref 28–44)
INTERVENTRICULAR SEPTUM IN DIASTOLE (PARASTERNAL SHORT AXIS VIEW): 0.6 CM
INTERVENTRICULAR SEPTUM: 0.6 CM (ref 0.6–1.1)
LAAS-AP2: 12 CM2
LAAS-AP4: 7.9 CM2
LEFT ATRIUM AREA SYSTOLE SINGLE PLANE A4C: 8.2 CM2
LEFT ATRIUM SIZE: 2.9 CM
LEFT INTERNAL DIMENSION IN SYSTOLE: 2.6 CM (ref 2.1–4)
LEFT VENTRICLE DIASTOLIC VOLUME (MOD BIPLANE): 113 ML
LEFT VENTRICLE SYSTOLIC VOLUME (MOD BIPLANE): 41 ML
LEFT VENTRICULAR INTERNAL DIMENSION IN DIASTOLE: 4 CM (ref 3.5–6)
LEFT VENTRICULAR POSTERIOR WALL IN END DIASTOLE: 0.7 CM
LEFT VENTRICULAR STROKE VOLUME: 46 ML
LV EF: 64 %
LVSV (TEICH): 46 ML
MV E'TISSUE VEL-SEP: 14 CM/S
MV PEAK A VEL: 0.51 M/S
MV PEAK E VEL: 92 CM/S
MV STENOSIS PRESSURE HALF TIME: 58 MS
MV VALVE AREA P 1/2 METHOD: 3.79
RIGHT ATRIUM AREA SYSTOLE A4C: 10.3 CM2
RIGHT VENTRICLE ID DIMENSION: 3 CM
SL CV LEFT ATRIUM LENGTH A2C: 4.3 CM
SL CV LV EF: 60
SL CV PED ECHO LEFT VENTRICLE DIASTOLIC VOLUME (MOD BIPLANE) 2D: 71 ML
SL CV PED ECHO LEFT VENTRICLE SYSTOLIC VOLUME (MOD BIPLANE) 2D: 24 ML
TR MAX PG: 19 MMHG
TR PEAK VELOCITY: 2.2 M/S
TRICUSPID VALVE PEAK REGURGITATION VELOCITY: 2.16 M/S

## 2022-10-20 PROCEDURE — 93306 TTE W/DOPPLER COMPLETE: CPT | Performed by: INTERNAL MEDICINE

## 2022-10-20 PROCEDURE — 93306 TTE W/DOPPLER COMPLETE: CPT

## 2023-01-23 ENCOUNTER — COSMETIC (OUTPATIENT)
Dept: PLASTIC SURGERY | Facility: CLINIC | Age: 38
End: 2023-01-23

## 2023-01-23 DIAGNOSIS — Z41.1 ENCOUNTER FOR COSMETIC PROCEDURE: ICD-10-CM

## 2023-01-24 NOTE — PROGRESS NOTES
Botox Consult     First time?: no, had with me in October, good results  Allergies: none  Blood thinners: no  Pregnant: no  Neuromuscular conditions: none     Patient has had botox, interested in maintenance       Will proceed with 40 units of botox     Risks and benefits discussed, patient agreed to proceed      10 units to each crows feet  16 units to glabellum  4 units to forehead     Total 40 units, 10% off, botox day     Patient tolerated well, f/u in 3 months        Ruby Cogan, MD   [facility] Plastic and Reconstructive Surgery   [address] [facility] [address], [address] [facility]   Office: [phone]

## 2023-01-30 NOTE — PROGRESS NOTES
Assessment   40 y o  Carlo Essex presenting for annual exam      Plan   Diagnoses and all orders for this visit:    Encntr for gyn exam (general) (routine) w/o abn findings  -     Junel FE 1/20 1-20 MG-MCG per tablet; Take 1 tablet by mouth daily    Attempting to conceive  -     Ambulatory Referral to Infertility; Future    Other orders  -     tretinoin (RETIN-A) 0 025 % cream        Pap up to date  Contraception- will continue on OCPs until wedding in August  Preconception counseling- would like the opportunity to possibly meet with fertility specialist  Her elias has a vasectomy but is hoping to have a reversal  With her age and possible complications conceiving due to vasectomy reversal wants to have contact information for fertility specialist  First recommended pt may want to take a pill break while covered with vasectomy for birth control to see if her cycles are normal  Also advised elias meet with urology to determine if he is an adequate candidate for vasectomy reversal  Provided contact information for [facility] which pt may consider having a preliminary consult with    SBE encouraged, A yearly mammogram is recommended for breast cancer screening starting at age 36  ASCCP guidelines reviewed  Advised to call with any issues, all concerns & questions addressed  See provided information in your after visit summary     F/U Annually and PRN    Results will be released to [facility], if abnormal will call or message to review and discuss treatment plan      __________________________________________________________________    Subjective     Michael Weeks is a 40 y o  Carlo Essex presenting for annual exam      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  Her partner is CT surgeon with the [employer]  They are hoping to conceive soon after the wedding  He will need a vasectomy reversal in order to conceive  They have not yet met with urology to discuss this   She wants resources today for a fertility specialist and wants to be sure they are no barriers for her to conceive  She has been on OCPs for years so she does not know what her cycles are like without hormonal regulation  Encouraged her to take a pill break while covered with vasectomy for birth control to determine regularity of her cycles and ovulation  SCREENING  Last Pap: 3/2021 neg/neg      GYN  Periods are light or absent on Junel 1/20    Sexually active: Yes - single partner - male  Contraception: vasectomy and OCPs    Hx Abnormal pap: hx of colpo many years ago; f/u paps wnl  We reviewed ASCCP guidelines for Pap testing today  Gardasil: She completed the Gardasil series  Denies vaginal discharge, itching, odor, dyspareunia, pelvic pain and vulvar/vaginal symptoms      OB           Complaints: denies   Denies urgency, frequency, hematuria, leakage / change in stream, difficulty urinating  BREAST  Complaints: denies   Denies: breast lump, breast tenderness, nipple discharge, skin color change, and skin lesion(s)      Pertinent Family Hx:   Family hx of breast cancer: no  Family hx of ovarian cancer: no  Family hx of colon cancer: F      GENERAL  PMH reviewed/updated and is as below  Patient does follow with a PCP  SOCIAL  Smoking: no  Alcohol:social  Drug: no   Occupation: PA for [employer]      Past Medical History:   Diagnosis Date   • Environmental allergies        No past surgical history on file        Current Outpatient Medications:   •  clobetasol (OLUX) 0 05 % topical foam, Apply topically 2 (two) times a day, Disp: 50 g, Rfl: 1  •  JUNEL FE 1/20 1-20 MG-MCG per tablet, , Disp: , Rfl:     No Known Allergies    Social History     Socioeconomic History   • Marital status: Single     Spouse name: Not on file   • Number of children: Not on file   • Years of education: Not on file   • Highest education level: Not on file   Occupational History   • Occupation: PA [employer]   Tobacco Use   • Smoking status: Never • Smokeless tobacco: Never   Vaping Use   • Vaping Use: Never used   Substance and Sexual Activity   • Alcohol use: Yes     Comment: Socially    • Drug use: Never   • Sexual activity: Yes     Partners: Male   Other Topics Concern   • Not on file   Social History Narrative   • Not on file     Social Determinants of Health     Financial Resource Strain: Not on file   Food Insecurity: Not on file   Transportation Needs: Not on file   Physical Activity: Not on file   Stress: Not on file   Social Connections: Not on file   Intimate Partner Violence: Not on file   Housing Stability: Not on file       Review of Systems     ROS:  Constitutional: Negative for fatigue and unexpected weight change  Respiratory: Negative for cough and shortness of breath  Cardiovascular: Negative for chest pain and palpitations  Gastrointestinal: Negative for abdominal pain and change in bowel habits  Breasts:  Negative, other than as noted above  Genitourinary: Negative, other than as noted above  Psychiatric: Negative for mood difficulties  Objective         Vitals:    01/31/23 1444   BP: 110/62       Physical Examination:    Patient appears well and is not in distress  Neck is supple without masses, no cervical or supraclavicular lymphadenopathy  Cardiovascular: regular rate and rhythm; no murmurs  Lungs: clear to auscultation bilaterally; no wheezes  Breasts are symmetrical without mass, tenderness, nipple discharge, skin changes or adenopathy  Abdomen is soft and nontender without masses  External genitals are normal without lesions or rashes  Urethral meatus and urethra are normal  Bladder is normal to palpation  Vagina is normal without discharge or bleeding  Cervix is normal without discharge or lesion  Uterus is normal, mobile, nontender without palpable mass  Adnexa are normal, nontender, without palpable mass

## 2023-01-31 ENCOUNTER — ANNUAL EXAM (OUTPATIENT)
Dept: OBGYN CLINIC | Facility: CLINIC | Age: 38
End: 2023-01-31

## 2023-01-31 VITALS
WEIGHT: 131 LBS | HEIGHT: 66 IN | DIASTOLIC BLOOD PRESSURE: 62 MMHG | BODY MASS INDEX: 21.05 KG/M2 | SYSTOLIC BLOOD PRESSURE: 110 MMHG

## 2023-01-31 DIAGNOSIS — Z78.9 ATTEMPTING TO CONCEIVE: ICD-10-CM

## 2023-01-31 DIAGNOSIS — Z01.419 ENCNTR FOR GYN EXAM (GENERAL) (ROUTINE) W/O ABN FINDINGS: Primary | ICD-10-CM

## 2023-02-01 RX ORDER — NORETHINDRONE ACETATE AND ETHINYL ESTRADIOL AND FERROUS FUMARATE 1MG-20(21)
1 KIT ORAL DAILY
Qty: 84 TABLET | Refills: 3 | Status: SHIPPED | OUTPATIENT
Start: 2023-02-01

## 2023-05-10 ENCOUNTER — COSMETIC (OUTPATIENT)
Dept: PLASTIC SURGERY | Facility: CLINIC | Age: 38
End: 2023-05-10

## 2023-05-10 DIAGNOSIS — Z41.1 ENCOUNTER FOR COSMETIC PROCEDURE: ICD-10-CM

## 2023-05-10 NOTE — PROGRESS NOTES
Botox Consult     First time?: no, had with me in May, good results but did have elevated lateral brow  Allergies: none  Blood thinners: no  Pregnant: no  Neuromuscular conditions: none     Patient has had botox, interested in maintenance  will inject more to lateral brow and also patient concerned with severe perioral wrinkles     Will proceed with 42 units of botox     Risks and benefits discussed, patient agreed to proceed      10 units to each crows feet  14 units to glabellum  2 units to central forehead  2 units to each lateral forehead  2 units to upper orbicularis oris     Total 42 units, 10% off     Patient tolerated well, f/u in 3 months        Mariam Lin MD   [facility] Plastic and Reconstructive Surgery   [address] [facility] [address], [address]   Office: [phone]

## 2023-05-11 ENCOUNTER — APPOINTMENT (OUTPATIENT)
Dept: LAB | Facility: CLINIC | Age: 38
End: 2023-05-11

## 2023-05-11 DIAGNOSIS — O09.529 ANTEPARTUM MULTIGRAVIDA OF ADVANCED MATERNAL AGE: ICD-10-CM

## 2023-05-11 LAB
ABO GROUP BLD: NORMAL
BASOPHILS # BLD AUTO: 0.02 THOUSANDS/ÂΜL (ref 0–0.1)
BASOPHILS NFR BLD AUTO: 0 % (ref 0–1)
EOSINOPHIL # BLD AUTO: 0.05 THOUSAND/ÂΜL (ref 0–0.61)
EOSINOPHIL NFR BLD AUTO: 1 % (ref 0–6)
ERYTHROCYTE [DISTWIDTH] IN BLOOD BY AUTOMATED COUNT: 12.2 % (ref 11.6–15.1)
HBV SURFACE AG SER QL: NORMAL
HCT VFR BLD AUTO: 38 % (ref 34.8–46.1)
HGB BLD-MCNC: 12.1 G/DL (ref 11.5–15.4)
HIV 1+2 AB+HIV1 P24 AG SERPL QL IA: NORMAL
HIV 2 AB SERPL QL IA: NORMAL
HIV1 AB SERPL QL IA: NORMAL
HIV1 P24 AG SERPL QL IA: NORMAL
IMM GRANULOCYTES # BLD AUTO: 0.02 THOUSAND/UL (ref 0–0.2)
IMM GRANULOCYTES NFR BLD AUTO: 0 % (ref 0–2)
LYMPHOCYTES # BLD AUTO: 1.67 THOUSANDS/ÂΜL (ref 0.6–4.47)
LYMPHOCYTES NFR BLD AUTO: 27 % (ref 14–44)
MCH RBC QN AUTO: 30.9 PG (ref 26.8–34.3)
MCHC RBC AUTO-ENTMCNC: 31.8 G/DL (ref 31.4–37.4)
MCV RBC AUTO: 97 FL (ref 82–98)
MONOCYTES # BLD AUTO: 0.44 THOUSAND/ÂΜL (ref 0.17–1.22)
MONOCYTES NFR BLD AUTO: 7 % (ref 4–12)
NEUTROPHILS # BLD AUTO: 3.92 THOUSANDS/ÂΜL (ref 1.85–7.62)
NEUTS SEG NFR BLD AUTO: 65 % (ref 43–75)
NRBC BLD AUTO-RTO: 0 /100 WBCS
PLATELET # BLD AUTO: 297 THOUSANDS/UL (ref 149–390)
PMV BLD AUTO: 10.3 FL (ref 8.9–12.7)
RBC # BLD AUTO: 3.92 MILLION/UL (ref 3.81–5.12)
RH BLD: POSITIVE
RUBV IGG SERPL IA-ACNC: 85.4 IU/ML
TREPONEMA PALLIDUM IGG+IGM AB [PRESENCE] IN SERUM OR PLASMA BY IMMUNOASSAY: NORMAL
TSH SERPL DL<=0.05 MIU/L-ACNC: 2.52 UIU/ML (ref 0.45–4.5)
VZV IGG SER QL IA: NORMAL
WBC # BLD AUTO: 6.12 THOUSAND/UL (ref 4.31–10.16)

## 2023-05-12 LAB
C TRACH DNA SPEC QL NAA+PROBE: NEGATIVE
HCV AB S/CO SERPL IA: NON REACTIVE
N GONORRHOEA DNA SPEC QL NAA+PROBE: NEGATIVE
SL AMB INTERPRETATION: NORMAL

## 2023-05-18 ENCOUNTER — COSMETIC (OUTPATIENT)
Dept: PLASTIC SURGERY | Facility: CLINIC | Age: 38
End: 2023-05-18

## 2023-05-18 DIAGNOSIS — Z41.1 ENCOUNTER FOR COSMETIC PROCEDURE: ICD-10-CM

## 2023-05-19 LAB — MIS SERPL-MCNC: 0.17 NG/ML

## 2023-05-21 NOTE — PROGRESS NOTES
Botox Consult     First time?: no, had with me one week ago, requires touchup to lateral frontalis due to high arch (frontalis activity)       Will proceed with 2 units of botox     Risks and benefits discussed, patient agreed to proceed       1 unit to each lateral frontalis     Total 2 units, 30% off     Patient tolerated well, f/u in 3 months        Katherine Morales MD   [facility] Plastic and Reconstructive Surgery   [address] [facility] [address], [address]   Office: [phone]

## 2023-08-02 ENCOUNTER — COSMETIC (OUTPATIENT)
Dept: PLASTIC SURGERY | Facility: CLINIC | Age: 38
End: 2023-08-02

## 2023-08-02 DIAGNOSIS — Z41.1 ENCOUNTER FOR COSMETIC PROCEDURE: ICD-10-CM

## 2023-08-02 PROCEDURE — BOTOX3 THREE OR MORE AREAS OR GREATER THAN 75 UNITS: Performed by: STUDENT IN AN ORGANIZED HEALTH CARE EDUCATION/TRAINING PROGRAM

## 2023-08-02 PROCEDURE — BOTOX1U PR BOTOX BY THE UNIT: Performed by: STUDENT IN AN ORGANIZED HEALTH CARE EDUCATION/TRAINING PROGRAM

## 2023-08-02 NOTE — PROGRESS NOTES
Botox Consult     First time?: no, had with me in May, good results but did have elevated lateral brow  Allergies: none  Blood thinners: no  Pregnant: no  Neuromuscular conditions: none     Patient has had botox, interested in maintenance. will inject more to lateral brow.     Will proceed with 40 units of botox     Risks and benefits discussed, patient agreed to proceed.     10 units to each crows feet  14 units to glabellum  4 units to central forehead  1 units to each lateral forehead     Total 40 units, 20% off     Patient tolerated well, f/u in 3 months        [place] MD Xavier   [address] [place] [address] Plastic and Reconstructive Surgery   [facility], [address]   Office: [phone]

## 2023-08-07 ENCOUNTER — APPOINTMENT (OUTPATIENT)
Dept: LAB | Facility: CLINIC | Age: 38
End: 2023-08-07

## 2023-08-07 DIAGNOSIS — Z00.8 HEALTH EXAMINATION IN POPULATION SURVEY: ICD-10-CM

## 2023-08-07 LAB
CHOLEST SERPL-MCNC: 176 MG/DL
EST. AVERAGE GLUCOSE BLD GHB EST-MCNC: 117 MG/DL
HBA1C MFR BLD: 5.7 %
HDLC SERPL-MCNC: 71 MG/DL
LDLC SERPL CALC-MCNC: 89 MG/DL (ref 0–100)
NONHDLC SERPL-MCNC: 105 MG/DL
TRIGL SERPL-MCNC: 78 MG/DL

## 2023-08-07 PROCEDURE — 80061 LIPID PANEL: CPT

## 2023-08-07 PROCEDURE — 83036 HEMOGLOBIN GLYCOSYLATED A1C: CPT

## 2023-08-07 PROCEDURE — 36415 COLL VENOUS BLD VENIPUNCTURE: CPT

## 2023-08-10 ENCOUNTER — COSMETIC (OUTPATIENT)
Dept: PLASTIC SURGERY | Facility: CLINIC | Age: 38
End: 2023-08-10

## 2023-08-10 DIAGNOSIS — Z41.1 ENCOUNTER FOR COSMETIC PROCEDURE: Primary | ICD-10-CM

## 2023-08-10 PROCEDURE — BOTOX1U PR BOTOX BY THE UNIT: Performed by: STUDENT IN AN ORGANIZED HEALTH CARE EDUCATION/TRAINING PROGRAM

## 2023-08-10 NOTE — PROGRESS NOTES
Botox Consult     First time?: no, had with me last week. Wants botox lip flip today    Patient has had botox, had last week with good results. Wants botox lip flip. Will proceed with 2 units of botox     Risks and benefits discussed, patient agreed to proceed.     2 units to upper orbicularis oris, lip flip     Total 2 units, 30% off     Patient tolerated well, f/u in 3 months        Km Laughlin MD   [facility] Plastic and Reconstructive Surgery   [facility], [address]   Office: [phone]

## 2023-08-24 DIAGNOSIS — B35.1 ONYCHOMYCOSIS: Primary | ICD-10-CM

## 2023-08-24 RX ORDER — TERBINAFINE HYDROCHLORIDE 250 MG/1
250 TABLET ORAL DAILY
Qty: 90 TABLET | Refills: 0 | Status: SHIPPED | OUTPATIENT
Start: 2023-08-24 | End: 2023-11-22

## 2023-12-28 ENCOUNTER — COSMETIC (OUTPATIENT)
Dept: PLASTIC SURGERY | Facility: CLINIC | Age: 38
End: 2023-12-28

## 2023-12-28 DIAGNOSIS — Z41.1 ENCOUNTER FOR COSMETIC PROCEDURE: Primary | ICD-10-CM

## 2023-12-28 PROCEDURE — BOTOX3 THREE OR MORE AREAS OR GREATER THAN 75 UNITS: Performed by: STUDENT IN AN ORGANIZED HEALTH CARE EDUCATION/TRAINING PROGRAM

## 2023-12-28 PROCEDURE — BOTOX1U PR BOTOX BY THE UNIT: Performed by: STUDENT IN AN ORGANIZED HEALTH CARE EDUCATION/TRAINING PROGRAM

## 2023-12-28 NOTE — PROGRESS NOTES
Botox Consult     First time?: no, had with me in August, good results   Allergies: none  Blood thinners: no  Pregnant: no  Neuromuscular conditions: none     Patient has had botox, interested in maintenance. will inject more to lateral brow. Also would like lip flip again.     Will proceed with 42 units of botox     Risks and benefits discussed, patient agreed to proceed.     10 units to each crows feet  14 units to glabellum  4 units to central forehead  1 units to each lateral forehead  2 units to upper orbicularis oris for lip flip     Total 42 units, 30% off employee     Patient tolerated well, f/u in 3 months        Bronson Raines MD   [facility] Plastic and Reconstructive Surgery   [address] [facility], [address]   [place] [address]   Office: [phone]

## 2024-01-19 ENCOUNTER — COSMETIC (OUTPATIENT)
Dept: PLASTIC SURGERY | Facility: CLINIC | Age: 39
End: 2024-01-19

## 2024-01-19 DIAGNOSIS — Z41.1 ENCOUNTER FOR COSMETIC SURGERY: Primary | ICD-10-CM

## 2024-01-19 PROCEDURE — RECHECK: Performed by: STUDENT IN AN ORGANIZED HEALTH CARE EDUCATION/TRAINING PROGRAM

## 2024-01-19 PROCEDURE — BOTOX1 ONE AREA OR 25 UNITS: Performed by: STUDENT IN AN ORGANIZED HEALTH CARE EDUCATION/TRAINING PROGRAM

## 2024-01-19 NOTE — PROGRESS NOTES
Botox checkup    Botox touchup, 1.5 units per lateral upper brow for treatment of overactive lateral brow.    Tolerated well.    Bronson Raines MD   [facility] Plastic and Reconstructive Surgery   [address] [place] [address], [address]   [place] [address]   Office: [phone]